# Patient Record
Sex: FEMALE | Race: OTHER
[De-identification: names, ages, dates, MRNs, and addresses within clinical notes are randomized per-mention and may not be internally consistent; named-entity substitution may affect disease eponyms.]

---

## 2020-08-06 ENCOUNTER — TRANSCRIPTION ENCOUNTER (OUTPATIENT)
Age: 60
End: 2020-08-06

## 2021-06-22 ENCOUNTER — EMERGENCY (EMERGENCY)
Facility: HOSPITAL | Age: 61
LOS: 1 days | Discharge: HOME | End: 2021-06-22
Attending: EMERGENCY MEDICINE
Payer: COMMERCIAL

## 2021-06-22 VITALS
HEIGHT: 63 IN | HEART RATE: 84 BPM | OXYGEN SATURATION: 100 % | RESPIRATION RATE: 16 BRPM | TEMPERATURE: 98 F | DIASTOLIC BLOOD PRESSURE: 91 MMHG | SYSTOLIC BLOOD PRESSURE: 183 MMHG | WEIGHT: 145.51 LBS

## 2021-06-22 PROCEDURE — 70450 CT HEAD/BRAIN W/O DYE: CPT | Mod: 26,MF

## 2021-06-22 PROCEDURE — 99284 EMERGENCY DEPT VISIT MOD MDM: CPT

## 2021-06-22 PROCEDURE — G1004: CPT

## 2021-06-22 PROCEDURE — 70450 CT HEAD/BRAIN W/O DYE: CPT

## 2021-06-22 PROCEDURE — 99284 EMERGENCY DEPT VISIT MOD MDM: CPT | Mod: 25

## 2021-06-22 RX ORDER — ACETAMINOPHEN 500 MG
650 TABLET ORAL ONCE
Refills: 0 | Status: COMPLETED | OUTPATIENT
Start: 2021-06-22 | End: 2021-06-22

## 2021-06-22 RX ADMIN — Medication 650 MILLIGRAM(S): at 10:18

## 2021-06-22 NOTE — ED STATDOCS - PATIENT PORTAL LINK FT
You can access the FollowMyHealth Patient Portal offered by [facility] by registering at the following website: [email or web address]. By joining [employer]’s FollowMyHealth portal, you will also be able to view your health information using other applications (apps) compatible with our system.

## 2021-06-22 NOTE — ED STATDOCS - CLINICAL SUMMARY MEDICAL DECISION MAKING FREE TEXT BOX
Likely concussion, CT head head injury without LOC and normal neuro exam:  Likely concussion. CT head, tylenol and ice pack

## 2021-06-22 NOTE — ED STATDOCS - NS ED SCRIBE STATEMENT
ED Time Seen By Provider Entered On:  10/18/2020 11:02     Performed On:  10/18/2020 11:02  by Keyur Mas DO               Time Seen By Provider   Time Seen by Provider :   10/18/2020 11:02    Keyur Mas DO - 10/18/2020 11:02                Electronically Signed On 10.18.2020 11:02  ___________________________________________________   Keyur Mas DO     Attending

## 2021-06-22 NOTE — ED ADULT NURSE NOTE - OBJECTIVE STATEMENT
60 y/o f a&ox3 comes to ED c/o was at work fell backwards hitting her head, -LOC. pt. denies getting dizzy.

## 2021-06-22 NOTE — ED STATDOCS - OBJECTIVE STATEMENT
62 y/o female with PMHx of HTN, DM s/p fall. Pt states while at work, pt was sitting in a chair with wheels, pt went to move in the chair and fell backward and hit the back of her head on floor. Pt denies LOC. Pt endorses headache. Pt denies N/V. Pt denies blood thinner usage. Pt denies neck or back pain.    PMD: Nathan Kumar 62 y/o female with PMHx of HTN, DM s/p fall backwards from a chair striking back of head on ground.  No loc.  headache and tender scalp swelling since.  No nausea/ vomiting.  denies neck pain or back pain.  No AC use.  .    PMD: Nathan Kumar    [facility] :  elmer

## 2021-06-22 NOTE — ED STATDOCS - PROGRESS NOTE DETAILS
patient seen by attending for fall, hit back of head. no loc,   CT ordered will follow up and reassess

## 2021-06-22 NOTE — ED STATDOCS - NSFOLLOWUPINSTRUCTIONS_ED_ALL_ED_FT
Tylenol extra strength 2 tablets every 4 hours or Ibuprofen 600mg (3 tablets) every 6 hours as needed for aches, pains. Apply ice to affected area for 15-20 minutes every few hours for the next few days.  Use as much or as frequently as desired. Otherwise, follow-up with PMD in 1-2 weeks for reassessment: call today to arrange an appointment

## 2021-06-22 NOTE — ED STATDOCS - NS_ ATTENDINGSCRIBEDETAILS _ED_A_ED_FT
I, Compa Nunez, performed the initial face to face bedside interview with this patient regarding history of present illness, review of symptoms and relevant past medical, social and family history.  I completed an independent physical examination.  I was the provider who initially evaluated this patient.  The history, relevant review of systems, past medical and surgical history, medical decision making, and physical examination was documented by the scribe in my presence and I attest to the accuracy of the documentation. Follow-up on ordered tests (ie labs, radiologic studies) and re-evaluation of the patient's status has been communicated to the ACP.  Disposition of the patient will be based on test outcome and response to ED interventions.

## 2022-06-14 PROBLEM — I10 ESSENTIAL (PRIMARY) HYPERTENSION: Chronic | Status: ACTIVE | Noted: 2021-06-22

## 2022-06-14 PROBLEM — E11.9 TYPE 2 DIABETES MELLITUS WITHOUT COMPLICATIONS: Chronic | Status: ACTIVE | Noted: 2021-06-22

## 2022-07-29 PROBLEM — Z00.00 ENCOUNTER FOR PREVENTIVE HEALTH EXAMINATION: Status: ACTIVE | Noted: 2022-07-29

## 2022-08-02 ENCOUNTER — APPOINTMENT (OUTPATIENT)
Dept: OBGYN | Facility: CLINIC | Age: 62
End: 2022-08-02

## 2022-08-02 VITALS
HEIGHT: 60 IN | WEIGHT: 130 LBS | SYSTOLIC BLOOD PRESSURE: 146 MMHG | BODY MASS INDEX: 25.52 KG/M2 | DIASTOLIC BLOOD PRESSURE: 78 MMHG

## 2022-08-02 DIAGNOSIS — Z83.3 FAMILY HISTORY OF DIABETES MELLITUS: ICD-10-CM

## 2022-08-02 DIAGNOSIS — Z82.49 FAMILY HISTORY OF ISCHEMIC HEART DISEASE AND OTHER DISEASES OF THE CIRCULATORY SYSTEM: ICD-10-CM

## 2022-08-02 DIAGNOSIS — I10 ESSENTIAL (PRIMARY) HYPERTENSION: ICD-10-CM

## 2022-08-02 DIAGNOSIS — E11.9 TYPE 2 DIABETES MELLITUS W/OUT COMPLICATIONS: ICD-10-CM

## 2022-08-02 PROCEDURE — 99386 PREV VISIT NEW AGE 40-64: CPT

## 2022-08-02 PROCEDURE — 99203 OFFICE O/P NEW LOW 30 MIN: CPT | Mod: 25

## 2022-08-02 RX ORDER — ATORVASTATIN CALCIUM 20 MG/1
20 TABLET, FILM COATED ORAL
Qty: 30 | Refills: 0 | Status: ACTIVE | COMMUNITY
Start: 2022-06-29

## 2022-08-02 RX ORDER — GLIPIZIDE 10 MG/1
10 TABLET, FILM COATED, EXTENDED RELEASE ORAL
Qty: 90 | Refills: 0 | Status: ACTIVE | COMMUNITY
Start: 2022-06-04

## 2022-08-02 RX ORDER — METFORMIN HYDROCHLORIDE 1000 MG/1
1000 TABLET, COATED ORAL
Qty: 180 | Refills: 0 | Status: ACTIVE | COMMUNITY
Start: 2022-06-04

## 2022-08-02 RX ORDER — FLUTICASONE PROPIONATE 50 UG/1
50 SPRAY, METERED NASAL
Qty: 10 | Refills: 0 | Status: ACTIVE | COMMUNITY
Start: 2022-04-01

## 2022-08-02 RX ORDER — BLOOD SUGAR DIAGNOSTIC
STRIP MISCELLANEOUS
Qty: 50 | Refills: 0 | Status: ACTIVE | COMMUNITY
Start: 2022-07-18

## 2022-08-02 RX ORDER — AMLODIPINE BESYLATE 10 MG/1
10 TABLET ORAL
Qty: 90 | Refills: 0 | Status: DISCONTINUED | COMMUNITY
Start: 2021-11-13

## 2022-08-02 RX ORDER — AMLODIPINE BESYLATE 10 MG/1
10 TABLET ORAL
Qty: 90 | Refills: 0 | Status: ACTIVE | COMMUNITY
Start: 2021-11-13

## 2022-08-02 RX ORDER — LOSARTAN POTASSIUM 100 MG/1
100 TABLET, FILM COATED ORAL
Qty: 90 | Refills: 0 | Status: ACTIVE | COMMUNITY
Start: 2022-07-29

## 2022-08-03 LAB
C TRACH RRNA SPEC QL NAA+PROBE: NOT DETECTED
CANDIDA VAG CYTO: NOT DETECTED
G VAGINALIS+PREV SP MTYP VAG QL MICRO: NOT DETECTED
N GONORRHOEA RRNA SPEC QL NAA+PROBE: NOT DETECTED
SOURCE TP AMPLIFICATION: NORMAL
T VAGINALIS VAG QL WET PREP: NOT DETECTED

## 2022-08-04 PROBLEM — Z82.49 FAMILY HISTORY OF HYPERTENSION: Status: ACTIVE | Noted: 2022-08-02

## 2022-08-04 PROBLEM — Z83.3 FAMILY HISTORY OF DIABETES MELLITUS: Status: ACTIVE | Noted: 2022-08-02

## 2022-08-04 NOTE — PHYSICAL EXAM
[Appropriately responsive] : appropriately responsive [Alert] : alert [No Acute Distress] : no acute distress [Soft] : soft [Non-tender] : non-tender [Non-distended] : non-distended [Oriented x3] : oriented x3 [Examination Of The Breasts] : a normal appearance [No Discharge] : no discharge [No Masses] : no breast masses were palpable [Labia Majora] : normal [Labia Minora] : normal [No Bleeding] : There was no active vaginal bleeding [Normal] : normal [Uterine Adnexae] : normal [Breast Nipple Inversion] : inverted [Atrophy] : atrophy [Dry Mucosa] : dry mucosa [FreeTextEntry1] :  Three wart like raised lesions posterior introitus.

## 2022-08-04 NOTE — HISTORY OF PRESENT ILLNESS
[N] : Patient reports normal menses [Tubal Occlusion] : has had a tubal occlusion [Y] : Positive pregnancy history [Menarche Age: ____] : age at menarche was [unfilled] [Yes] : Patient has concerns regarding sex [Currently Active] : currently active [No] : No [Patient refuses STI testing] : Patient refuses STI testing [Mammogramdate] : 01/2021 [TextBox_19] : AS PER PATIENT  [PapSmeardate] : 12/2021 [TextBox_31] : AS PER PATIENT  [LMPDate] : 2008 [PGHxTotal] : 4 [[facility]Term] : 3 [Banneriving] : 3 [FreeTextEntry1] : DRYNESS, PAIN

## 2022-08-04 NOTE — DISCUSSION/SUMMARY
[FreeTextEntry1] : Benign breast and pelvic exam.. Pap done today. Affirm collected. Stool for occult blood was negative.\par \par Prescription for mammogram screening and breast US given. I recommended to receive her breast imaging at a [facility]. \par \par Self-breast exam reviewed.\par \par We discussed my recommendation for coconut oil for vaginal dryness as needed. \par \par F/u vulvar biopsy.

## 2022-08-04 NOTE — END OF VISIT
[FreeTextEntry3] : I, Carl Downing solely acted as scribe for Dr. Venus Tracey on 08/02/2022 \par All medical entries made by the Scribe were at my, Dr. Tracey’s, direction and personally\par dictated by me on 08/02/2022 . I have reviewed the chart and agree that the record\par accurately reflects my personal performance of the history, physical exam, assessment and plan. I\par have also personally directed, reviewed, and agreed with the chart.

## 2022-08-06 LAB — CYTOLOGY CVX/VAG DOC THIN PREP: NORMAL

## 2022-08-10 ENCOUNTER — APPOINTMENT (OUTPATIENT)
Dept: OBGYN | Facility: CLINIC | Age: 62
End: 2022-08-10

## 2022-08-10 VITALS
BODY MASS INDEX: 25.52 KG/M2 | SYSTOLIC BLOOD PRESSURE: 136 MMHG | HEIGHT: 60 IN | DIASTOLIC BLOOD PRESSURE: 76 MMHG | WEIGHT: 130 LBS | TEMPERATURE: 97 F

## 2022-08-10 DIAGNOSIS — Z01.419 ENCOUNTER FOR GYNECOLOGICAL EXAMINATION (GENERAL) (ROUTINE) W/OUT ABNORMAL FINDINGS: ICD-10-CM

## 2022-08-10 DIAGNOSIS — Z92.89 PERSONAL HISTORY OF OTHER MEDICAL TREATMENT: ICD-10-CM

## 2022-08-10 DIAGNOSIS — Z12.11 ENCOUNTER FOR SCREENING FOR MALIGNANT NEOPLASM OF COLON: ICD-10-CM

## 2022-08-10 DIAGNOSIS — Z87.42 PERSONAL HISTORY OF OTHER DISEASES OF THE FEMALE GENITAL TRACT: ICD-10-CM

## 2022-08-10 PROCEDURE — 11423 EXC H-F-NK-SP B9+MARG 2.1-3: CPT

## 2022-08-15 PROBLEM — Z01.419 ENCOUNTER FOR WELL WOMAN EXAM WITH ROUTINE GYNECOLOGICAL EXAM: Status: RESOLVED | Noted: 2022-08-02 | Resolved: 2022-08-15

## 2022-08-15 PROBLEM — Z87.42 HISTORY OF VAGINAL DISCHARGE: Status: RESOLVED | Noted: 2022-08-02 | Resolved: 2022-08-15

## 2022-08-15 PROBLEM — Z92.89 HISTORY OF SCREENING MAMMOGRAPHY: Status: RESOLVED | Noted: 2022-08-02 | Resolved: 2022-08-15

## 2022-08-15 PROBLEM — Z12.11 COLON CANCER SCREENING: Status: RESOLVED | Noted: 2022-08-02 | Resolved: 2022-08-15

## 2022-08-15 NOTE — PHYSICAL EXAM
[Chaperone Present] : A chaperone was present in the examining room during all aspects of the physical examination [Appropriately responsive] : appropriately responsive [Alert] : alert [No Acute Distress] : no acute distress [Oriented x3] : oriented x3 [FreeTextEntry1] : KEVIN Lilly

## 2022-08-15 NOTE — HISTORY OF PRESENT ILLNESS
[postmenopausal] : postmenopausal [N] : Patient reports normal menses [Tubal Occlusion] : has had a tubal occlusion [Y] : Positive pregnancy history [Menarche Age: ____] : age at menarche was [unfilled] [No] : Patient does not have concerns regarding sex [Currently Active] : currently active [PapSmeardate] : 08/02/22 [TextBox_31] : NEG [GonorrheaDate] : 08/02/22 [TextBox_63] : NEG [ChlamydiaDate] : 08/02/22 [TextBox_68] : NEG [LMPDate] : 2008 [PGHxTotal] : 4 [[facility]Term] : 3 [[facility] Centeriving] : 3 [FreeTextEntry1] : 2008

## 2022-08-15 NOTE — DISCUSSION/SUMMARY
[FreeTextEntry1] : Vulvar biopsy done today. Consent obtained. 2x2.5 cm lesion removed and sent to pathology. Patient tolerated well. Aftercare instructions given. \par \par Call parameters and precautions given.\par \par She will follow up in 1 week for a follow up of her results and suture removal

## 2022-08-15 NOTE — END OF VISIT
[FreeTextEntry3] : I, Roxy Loza, solely acted as a scribe for Dr. Venus Tracey on 08/10/2022 . All medical entries made by the Scribe were at my, Dr. Tracey's, direction and personally dictated by me on 08/10/2022. I have reviewed the chart and agree that the record accurately reflects my personal performance of the history, physical exam, assessment and plan. I have also personally directed, reviewed and agreed with the chart.

## 2022-08-15 NOTE — PROCEDURE
[Vulvar Biopsy] : Vulvar Biopsy [Consent Obtained] : Consent obtained [Local Anesthesia] : local anesthesia [____ Lidocaine w/o Epi] : ~VmL lidocaine without epinephrine [Betadine] : Betadine [Sent to Pathology] : placed in buffered formalin and sent for pathology [Scalpel] : scalpel [Saskia's] : Saskia's solution [Silver Nitrate] : silver nitrate [Tolerated Well] : the patient tolerated the procedure well [No Complications] : there were no complications [Size of Biopsy Taken: ___ (mm)] : [unfilled]Umm [de-identified] : Posterior vagina w/ 3 large wart like lesions  [de-identified] : Area swabbed w/ betadine. Lidocaine injected.  [de-identified] : Continuous stitch used for larger incision.

## 2022-08-17 ENCOUNTER — APPOINTMENT (OUTPATIENT)
Dept: OBGYN | Facility: CLINIC | Age: 62
End: 2022-08-17

## 2022-08-17 VITALS
DIASTOLIC BLOOD PRESSURE: 70 MMHG | SYSTOLIC BLOOD PRESSURE: 120 MMHG | HEIGHT: 60 IN | BODY MASS INDEX: 25.52 KG/M2 | WEIGHT: 130 LBS

## 2022-08-17 DIAGNOSIS — N90.89 OTHER SPECIFIED NONINFLAMMATORY DISORDERS OF VULVA AND PERINEUM: ICD-10-CM

## 2022-08-17 PROCEDURE — 99024 POSTOP FOLLOW-UP VISIT: CPT

## 2022-08-17 NOTE — HISTORY OF PRESENT ILLNESS
[N] : Patient reports normal menses [Tubal Occlusion] : has had a tubal occlusion [Y] : Positive pregnancy history [Menarche Age: ____] : age at menarche was [unfilled] [Currently Active] : currently active [PapSmeardate] : 8/02/2022 [TextBox_31] : WNL [GonorrheaDate] : 8/02/2022 [TextBox_63] : NEG [ChlamydiaDate] : 8/02/2022 [TextBox_68] : NEG [LMPDate] : 2008 [PGHxTotal] : 4 [[facility] CenterlTerm] : 3 [[facility]iving] : 3 [FreeTextEntry1] : 2008

## 2022-08-17 NOTE — PHYSICAL EXAM
[Appropriately responsive] : appropriately responsive [Alert] : alert [No Acute Distress] : no acute distress [Oriented x3] : oriented x3 [FreeTextEntry1] : Biopsy site healing well. Sutures removed.

## 2022-08-19 ENCOUNTER — NON-APPOINTMENT (OUTPATIENT)
Age: 62
End: 2022-08-19

## 2022-08-19 LAB — CORE LAB BIOPSY: NORMAL

## 2023-05-03 NOTE — DISCUSSION/SUMMARY
Patient was contacted to complete the pre-visit call prior to their telephone visit with the provider.     Allergies and medications were reviewed.     I thanked them for their time to cover this information.     Melissa Fuentes, CMA       [FreeTextEntry1] : Healing well. Sutures removed. No sign of infection. \par \par Pathology still pending. Will call her when results are available.\par \par F/u annually or as needed.

## 2024-08-04 ENCOUNTER — EMERGENCY (EMERGENCY)
Facility: HOSPITAL | Age: 64
LOS: 1 days | Discharge: HOME | End: 2024-08-04
Attending: STUDENT IN AN ORGANIZED HEALTH CARE EDUCATION/TRAINING PROGRAM
Payer: COMMERCIAL

## 2024-08-04 VITALS
SYSTOLIC BLOOD PRESSURE: 163 MMHG | RESPIRATION RATE: 18 BRPM | TEMPERATURE: 99 F | OXYGEN SATURATION: 96 % | DIASTOLIC BLOOD PRESSURE: 80 MMHG | HEART RATE: 98 BPM

## 2024-08-04 PROCEDURE — 99285 EMERGENCY DEPT VISIT HI MDM: CPT | Mod: 25

## 2024-08-05 VITALS
RESPIRATION RATE: 18 BRPM | OXYGEN SATURATION: 98 % | TEMPERATURE: 98 F | HEART RATE: 79 BPM | SYSTOLIC BLOOD PRESSURE: 151 MMHG | DIASTOLIC BLOOD PRESSURE: 70 MMHG

## 2024-08-05 LAB
ACETONE SERPL-MCNC: NEGATIVE — SIGNIFICANT CHANGE UP
ALBUMIN SERPL ELPH-MCNC: 4.2 G/DL — SIGNIFICANT CHANGE UP (ref 3.3–5.2)
ALP SERPL-CCNC: 80 U/L — SIGNIFICANT CHANGE UP (ref 40–120)
ALT FLD-CCNC: 20 U/L — SIGNIFICANT CHANGE UP
ANION GAP SERPL CALC-SCNC: 17 MMOL/L — SIGNIFICANT CHANGE UP (ref 5–17)
ANION GAP SERPL CALC-SCNC: 18 MMOL/L — HIGH (ref 5–17)
AST SERPL-CCNC: 23 U/L — SIGNIFICANT CHANGE UP
BASOPHILS # BLD AUTO: 0.1 K/UL — SIGNIFICANT CHANGE UP (ref 0–0.2)
BASOPHILS NFR BLD AUTO: 1 % — SIGNIFICANT CHANGE UP (ref 0–2)
BILIRUB SERPL-MCNC: 0.3 MG/DL — LOW (ref 0.4–2)
BUN SERPL-MCNC: 19.8 MG/DL — SIGNIFICANT CHANGE UP (ref 8–20)
BUN SERPL-MCNC: 21.4 MG/DL — HIGH (ref 8–20)
CALCIUM SERPL-MCNC: 9 MG/DL — SIGNIFICANT CHANGE UP (ref 8.4–10.5)
CALCIUM SERPL-MCNC: 9.6 MG/DL — SIGNIFICANT CHANGE UP (ref 8.4–10.5)
CHLORIDE SERPL-SCNC: 103 MMOL/L — SIGNIFICANT CHANGE UP (ref 96–108)
CHLORIDE SERPL-SCNC: 97 MMOL/L — SIGNIFICANT CHANGE UP (ref 96–108)
CO2 SERPL-SCNC: 18 MMOL/L — LOW (ref 22–29)
CO2 SERPL-SCNC: 20 MMOL/L — LOW (ref 22–29)
CREAT SERPL-MCNC: 0.64 MG/DL — SIGNIFICANT CHANGE UP (ref 0.5–1.3)
CREAT SERPL-MCNC: 0.66 MG/DL — SIGNIFICANT CHANGE UP (ref 0.5–1.3)
EGFR: 99 ML/MIN/1.73M2 — SIGNIFICANT CHANGE UP
EGFR: 99 ML/MIN/1.73M2 — SIGNIFICANT CHANGE UP
EOSINOPHIL # BLD AUTO: 0.2 K/UL — SIGNIFICANT CHANGE UP (ref 0–0.5)
EOSINOPHIL NFR BLD AUTO: 2 % — SIGNIFICANT CHANGE UP (ref 0–6)
GAS PNL BLDV: SIGNIFICANT CHANGE UP
GLUCOSE SERPL-MCNC: 127 MG/DL — HIGH (ref 70–99)
GLUCOSE SERPL-MCNC: 167 MG/DL — HIGH (ref 70–99)
HCT VFR BLD CALC: 37.3 % — SIGNIFICANT CHANGE UP (ref 34.5–45)
HGB BLD-MCNC: 12.5 G/DL — SIGNIFICANT CHANGE UP (ref 11.5–15.5)
IMM GRANULOCYTES NFR BLD AUTO: 0.3 % — SIGNIFICANT CHANGE UP (ref 0–0.9)
LACTATE BLDV-MCNC: 2.6 MMOL/L — HIGH (ref 0.5–2)
LYMPHOCYTES # BLD AUTO: 2.19 K/UL — SIGNIFICANT CHANGE UP (ref 1–3.3)
LYMPHOCYTES # BLD AUTO: 22.2 % — SIGNIFICANT CHANGE UP (ref 13–44)
MCHC RBC-ENTMCNC: 29.9 PG — SIGNIFICANT CHANGE UP (ref 27–34)
MCHC RBC-ENTMCNC: 33.5 GM/DL — SIGNIFICANT CHANGE UP (ref 32–36)
MCV RBC AUTO: 89.2 FL — SIGNIFICANT CHANGE UP (ref 80–100)
MONOCYTES # BLD AUTO: 0.53 K/UL — SIGNIFICANT CHANGE UP (ref 0–0.9)
MONOCYTES NFR BLD AUTO: 5.4 % — SIGNIFICANT CHANGE UP (ref 2–14)
NEUTROPHILS # BLD AUTO: 6.82 K/UL — SIGNIFICANT CHANGE UP (ref 1.8–7.4)
NEUTROPHILS NFR BLD AUTO: 69.1 % — SIGNIFICANT CHANGE UP (ref 43–77)
PLATELET # BLD AUTO: 339 K/UL — SIGNIFICANT CHANGE UP (ref 150–400)
POTASSIUM SERPL-MCNC: 3.9 MMOL/L — SIGNIFICANT CHANGE UP (ref 3.5–5.3)
POTASSIUM SERPL-MCNC: 3.9 MMOL/L — SIGNIFICANT CHANGE UP (ref 3.5–5.3)
POTASSIUM SERPL-SCNC: 3.9 MMOL/L — SIGNIFICANT CHANGE UP (ref 3.5–5.3)
POTASSIUM SERPL-SCNC: 3.9 MMOL/L — SIGNIFICANT CHANGE UP (ref 3.5–5.3)
PROT SERPL-MCNC: 7.2 G/DL — SIGNIFICANT CHANGE UP (ref 6.6–8.7)
RBC # BLD: 4.18 M/UL — SIGNIFICANT CHANGE UP (ref 3.8–5.2)
RBC # FLD: 13.2 % — SIGNIFICANT CHANGE UP (ref 10.3–14.5)
SODIUM SERPL-SCNC: 135 MMOL/L — SIGNIFICANT CHANGE UP (ref 135–145)
SODIUM SERPL-SCNC: 138 MMOL/L — SIGNIFICANT CHANGE UP (ref 135–145)
WBC # BLD: 9.87 K/UL — SIGNIFICANT CHANGE UP (ref 3.8–10.5)
WBC # FLD AUTO: 9.87 K/UL — SIGNIFICANT CHANGE UP (ref 3.8–10.5)

## 2024-08-05 PROCEDURE — 96360 HYDRATION IV INFUSION INIT: CPT

## 2024-08-05 PROCEDURE — 85025 COMPLETE CBC W/AUTO DIFF WBC: CPT

## 2024-08-05 PROCEDURE — 80048 BASIC METABOLIC PNL TOTAL CA: CPT

## 2024-08-05 PROCEDURE — 93005 ELECTROCARDIOGRAM TRACING: CPT

## 2024-08-05 PROCEDURE — 85018 HEMOGLOBIN: CPT

## 2024-08-05 PROCEDURE — 84132 ASSAY OF SERUM POTASSIUM: CPT

## 2024-08-05 PROCEDURE — 82435 ASSAY OF BLOOD CHLORIDE: CPT

## 2024-08-05 PROCEDURE — T1013: CPT

## 2024-08-05 PROCEDURE — 82803 BLOOD GASES ANY COMBINATION: CPT

## 2024-08-05 PROCEDURE — 93010 ELECTROCARDIOGRAM REPORT: CPT

## 2024-08-05 PROCEDURE — 99283 EMERGENCY DEPT VISIT LOW MDM: CPT

## 2024-08-05 PROCEDURE — 36415 COLL VENOUS BLD VENIPUNCTURE: CPT

## 2024-08-05 PROCEDURE — 80053 COMPREHEN METABOLIC PANEL: CPT

## 2024-08-05 PROCEDURE — 82009 KETONE BODYS QUAL: CPT

## 2024-08-05 PROCEDURE — 82330 ASSAY OF CALCIUM: CPT

## 2024-08-05 PROCEDURE — 83605 ASSAY OF LACTIC ACID: CPT

## 2024-08-05 PROCEDURE — 82947 ASSAY GLUCOSE BLOOD QUANT: CPT

## 2024-08-05 PROCEDURE — 84295 ASSAY OF SERUM SODIUM: CPT

## 2024-08-05 PROCEDURE — 85014 HEMATOCRIT: CPT

## 2024-08-05 RX ORDER — BACTERIOSTATIC SODIUM CHLORIDE 0.9 %
1000 VIAL (ML) INJECTION ONCE
Refills: 0 | Status: COMPLETED | OUTPATIENT
Start: 2024-08-05 | End: 2024-08-05

## 2024-08-05 RX ADMIN — Medication 1000 MILLILITER(S): at 01:45

## 2024-08-05 RX ADMIN — Medication 1000 MILLILITER(S): at 00:55

## 2024-08-05 RX ADMIN — Medication 1000 MILLILITER(S): at 02:25

## 2024-08-05 NOTE — ED PROVIDER NOTE - NSFOLLOWUPINSTRUCTIONS_ED_ALL_ED_FT
Deshidratación en los adultos  Dehydration, Adult  La deshidratación es josé afección que se caracteriza por josé cantidad insuficiente de agua u otros líquidos en el organismo. [place] sucede cuando josé persona pierde más líquido del que consume. Los órganos importantes, abhishek los riñones, el cerebro y el corazón, no pueden funcionar sin la cantidad adecuada de líquidos. Cualquier pérdida de líquidos del organismo puede causar deshidratación.    La deshidratación puede ser leve, moderada o grave. Debe tratarse de inmediato para evitar que se agrave.    ¿Cuáles son las causas?  Las causas de la deshidratación pueden ser las siguientes:  Problemas de sarah, abhishek diarrea, vómitos, fiebre, infección, o sudar u orinar mucho.  No beber la cantidad suficiente de líquidos.  Determinados medicamentos, abhishek aquellos que eliminan el exceso de líquido del cuerpo (diuréticos).  Falta de agua potable marinelli.  No poder obtener suficiente agua y alimentos.  ¿Qué incrementa el riesgo?  Los siguientes factores pueden hacer que sea más propenso a desarrollar esta afección:  Tener josé enfermedad prolongada (crónica) que no se ha tratado adecuadamente, abhishek diabetes, enfermedad cardíaca o enfermedad renal.  Ser mayor de 65 años de edad.  Tener josé discapacidad.  Vivir en un lugar de gran altitud, donde el aire menos denso y más seco causa más pérdida de líquidos.  Hacer ejercicios que sobrecargan el cuerpo ya mucho tiempo (deportes de resistencia).  Estar activo en un clima caluroso.  ¿Cuáles son los signos o síntomas?  Los síntomas de deshidratación dependen de sidhu gravedad.    Deshidratación leve o moderada    Sed.  Sequedad en los labios o la boca.  Mareos o sensación de desvanecimiento.  Calambres musculares.  Orina de color oscuro. La orina puede ser color té.  Demario producción de orina o lágrimas que lo normal.  Dolor de aysha.  Deshidratación grave    Cambios en la piel. La piel puede estar fría y pegajosa, con manchas o pálida. También es posible que la piel no vuelva a la normalidad después de pellizcarla ligeramente y soltarla.  Escasa producción o ausencia de lágrimas, orina o sudor.  Respiración rápida y presión arterial baja. El pulso puede ser débil o puede tener más de 100 latidos por minuto cuando está sentado quieto.  Otros cambios, por ejemplo:  Sentir mucha sed.  Ojos hundidos.  Bonnie y pies fríos.  Confusión.  Estar muy cansado (aletargado) o tener problemas para despertarse.  Pérdida de peso a corto plazo.  Pérdida de la conciencia.  ¿Cómo se diagnostica?  Esta afección se diagnostica en función de los síntomas y de un examen físico. Se le pueden hacer análisis de wendi y orina para ayudar a confirmar el diagnóstico.    ¿Cómo se trata?  A person's hand, showing an inserted IV catheter.   El tratamiento de esta afección depende de sidhu gravedad. El tratamiento se debe comenzar de inmediato. No espere hasta que la deshidratación sea grave. La deshidratación grave es josé emergencia y debe tratarse en un hospital.  La deshidratación leve o moderada puede tratarse en la casa. Le pedirán que:  Brigid más líquidos.  Brigid josé solución de rehidratación oral (SRO). Esta bebida restablece los líquidos, las sales y los minerales en la wendi (electrolitos).  Interrumpa cualquier actividad que haya causado deshidratación, abhishek hacer ejercicio.  Se refresque con compresas frías, bruma fría o líquidos fríos, si el calor o el exceso de sudor le causaron la afección.  Ballou medicamentos para tratar la fiebre, si la fiebre le causó la afección.  Ballou medicamentos para tratar las náuseas y la diarrea, si los vómitos o la diarrea le causaron la afección.  La deshidratación grave puede tratarse de la siguiente manera:  Con líquidos intravenosos (i.v.).  Corrigiendo los niveles anormales de electrolitos en el organismo.  Tratando la causa subyacente de la deshidratación.  Siga estas instrucciones en sidhu casa:  Solución de rehidratación oral    A glass of water with a spoonful of ORS ready to be added to it.  Si se lo indicó el médico, tome josé SRO:  Para preparar josé SRO, siga las instrucciones del envase.  Comience por beber pequeñas cantidades, aproximadamente ½ taza (120 ml) cada 5 a 10 minutos.  Aumente lentamente la cantidad que luana hasta que haya ingerido la cantidad recomendada por el médico.  Comida y bebida    A person drinking water from a glass.   Brigid suficiente líquido transparente abhishek para mantener la orina de color amarillo pálido. Si le indicaron que brigid josé SRO, termine dannielle la solución y luego empiece a beber lentamente otros líquidos transparentes. Brigid líquidos, por ejemplo:  Agua. No brigid solamente agua. [place] puede provocar hiponatremia, que es tener escasez de sal (sodio) en el organismo.  Agua de trocitos de hielo que usted succiona.  Jugo de frutas diluido. [place] es jugo de frutas rebajado con agua.  Bebidas deportivas de bajas calorías.  Consuma los alimentos que contienen un equilibrio saludable de electrolitos, abhishek las bananas, las naranjas, las roberto, los tomates y la espinaca.  No brigid alcohol.  Evite lo siguiente:  Bebidas que contengan gran cantidad de azúcar. Entre estas se incluyen las bebidas deportivas ricas en calorías, el jugo de frutas sin diluir y los refrescos o gaseosas.  Cafeína.  Los alimentos con alto contenido de grasa o azúcar.  Instrucciones generales    Use los medicamentos de venta monika y los recetados solamente abhishek se lo haya indicado el médico.  No tome comprimidos de sodio. [place] puede causar la acumulación excesiva de sodio en el organismo (hipernatremia).  Retome aubree actividades normales abhishek se lo haya indicado el médico. Pregúntele al médico qué actividades son seguras para usted.  Concurra a todas las visitas de seguimiento. Es posible que el médico deba controlar sidhu evolución y le sugiera nuevas formas de tratar sidhu afección.  Comuníquese con un médico si:  Tiene calambres musculares, dolor o molestias, por ejemplo:  Dolor en el abdomen y el dolor empeora o se mantiene en un área.  Rigidez en el katarzyna.  Tiene josé erupción cutánea.  Se siente más irritable de lo habitual.  Está más somnoliento o le mo más despertarse.  Se siente débil o mareado.  Tiene mucha sed.  Solicite ayuda de inmediato si:  Tiene síntomas de deshidratación grave.  Vomita cada vez que come o luana.  Los vómitos empeoran, no desaparecen o incluyen wendi o josé sustancia harvinder (bilis).  Recibe tratamiento, екатерина los síntomas empeoran.  Tiene fiebre.  Tiene un dolor de aysha intenso.  Tiene lo siguiente:  Diarrea que empeora o que no desaparece.  Wendi en las heces. [place] puede hacer que la materia fecal sea aleida y de aspecto alquitranado.  No orina u orina solamente josé pequeña cantidad de color muy oscuro en el término de 6 a 8 horas.  Tiene dificultad para respirar.  Estos síntomas pueden indicar josé emergencia. Solicite ayuda de inmediato.  No espere a janis si los síntomas desaparecen.  No conduzca por aubree propios medios hasta el hospital. Llame al 911.  Esta información no tiene abhishek fin reemplazar el consejo del médico. Asegúrese de hacerle al médico cualquier pregunta que tenga.

## 2024-08-05 NOTE — ED ADULT NURSE NOTE - OBJECTIVE STATEMENT
presents to ED c/o hypertension, dizziness, pain to left arm and neck, and a "trumbling" sensation in chest. states her doctor changed her bp medication one day ago. denies  SOB/dyspnea, dizziness/headache/lightheadedness/blurry vision tingling/numbness, fevers/chills, N/V/D, urinary S&S. did not take medication for pain prior to arrival.

## 2024-08-05 NOTE — ED PROVIDER NOTE - PHYSICAL EXAMINATION
Constitutional: Awake, alert, in no acute distress  Eyes: no scleral icterus  HENT: normocephalic, atraumatic, moist oral mucosa  Neck: supple  CV: RRR, no murmur  Pulm: non-labored respirations, CTAB  Abdomen: soft, non-tender, non-distended  Extremities: no edema, no deformity  Skin: no rash, no jaundice  Neuro: AAOx3, moving all extremities equally, no focal neuro deficits, ambulatory with steady gait Constitutional: Awake, alert, in no acute distress  Eyes: no scleral icterus  HENT: normocephalic, atraumatic, +dry oral mucosa  Neck: supple  CV: RRR, no murmur  Pulm: non-labored respirations, CTAB  Abdomen: soft, non-tender, non-distended  Extremities: no edema, no deformity  Skin: no rash, no jaundice  Neuro: AAOx3, moving all extremities equally, no focal neuro deficits, ambulatory with steady gait

## 2024-08-05 NOTE — ED PROVIDER NOTE - CLINICAL SUMMARY MEDICAL DECISION MAKING FREE TEXT BOX
64y F w/ hx HTN, DM; presents for dizziness and concern of elevated BP. Pt with nonfocal neuro exam. Labs notable for elevated lactate. No sign of DKA. Pt felt better with fluids. Symptoms likely 2/2 dehydration. Medically stable for discharge with outpatient f/u.

## 2024-08-05 NOTE — ED PROVIDER NOTE - PATIENT PORTAL LINK FT
You can access the FollowMyHealth Patient Portal offered by [facility] by registering at the following website: [email or web address]. By joining [employer]’s FollowMyHealth portal, you will also be able to view your health information using other applications (apps) compatible with our system.

## 2024-08-05 NOTE — ED ADULT NURSE NOTE - NSFALLUNIVINTERV_ED_ALL_ED
Bed/Stretcher in lowest position, wheels locked, appropriate side rails in place/Call bell, personal items and telephone in reach/Instruct patient to call for assistance before getting out of bed/chair/stretcher/Non-slip footwear applied when patient is off stretcher/[place] to call system/Physically safe environment - no spills, clutter or unnecessary equipment/Purposeful proactive rounding/Room/bathroom lighting operational, light cord in reach

## 2024-08-05 NOTE — ED PROVIDER NOTE - OBJECTIVE STATEMENT
64y F w/ hx HTN, HLD, DM; presents for dizziness. Pt reports 1 week of dizziness and generalized malaise. No fever, cough, chest pain, SOB. Says she saw her PCP yesterday; was noted to have elevated BP and had her meds adjusted. Pt now presents with persistent symptoms. Says her BP was elevated to 180s systolic prior to arrival.

## 2024-08-07 DIAGNOSIS — R53.1 WEAKNESS: ICD-10-CM

## 2024-08-07 DIAGNOSIS — I10 ESSENTIAL (PRIMARY) HYPERTENSION: ICD-10-CM

## 2024-08-07 DIAGNOSIS — R42 DIZZINESS AND GIDDINESS: ICD-10-CM

## 2024-08-07 DIAGNOSIS — E11.9 TYPE 2 DIABETES MELLITUS WITHOUT COMPLICATIONS: ICD-10-CM

## 2024-08-26 NOTE — ED ADULT NURSE NOTE - INTERPRETER'S NAME
Prep Survey      Flowsheet Row Responses   [facility] patient discharged from? [facility]   Is LACE score < 7 ? Yes   Eligibility [facility]   Date of Admission 08/25/24   Date of Discharge 08/26/24   Discharge Disposition Home or Self Care   Discharge diagnosis SHAVON (acute kidney injury)   Does the patient have one of the following disease processes/diagnoses(primary or secondary)? Other   Does the patient have Home health ordered? No   Is there a DME ordered? No   Prep survey completed? Yes            Yudy PACK - Registered Nurse          
henderson

## 2024-11-06 ENCOUNTER — EMERGENCY (EMERGENCY)
Facility: HOSPITAL | Age: 64
LOS: 1 days | End: 2024-11-06
Attending: EMERGENCY MEDICINE
Payer: COMMERCIAL

## 2024-11-06 VITALS
DIASTOLIC BLOOD PRESSURE: 80 MMHG | TEMPERATURE: 99 F | HEIGHT: 60 IN | WEIGHT: 128.75 LBS | HEART RATE: 78 BPM | SYSTOLIC BLOOD PRESSURE: 170 MMHG | RESPIRATION RATE: 18 BRPM | OXYGEN SATURATION: 97 %

## 2024-11-06 LAB
A1C WITH ESTIMATED AVERAGE GLUCOSE RESULT: 8.3 % — HIGH (ref 4–5.6)
ALBUMIN SERPL ELPH-MCNC: 4.1 G/DL — SIGNIFICANT CHANGE UP (ref 3.3–5.2)
ALP SERPL-CCNC: 71 U/L — SIGNIFICANT CHANGE UP (ref 40–120)
ALT FLD-CCNC: 16 U/L — SIGNIFICANT CHANGE UP
ANION GAP SERPL CALC-SCNC: 15 MMOL/L — SIGNIFICANT CHANGE UP (ref 5–17)
AST SERPL-CCNC: 27 U/L — SIGNIFICANT CHANGE UP
BASOPHILS # BLD AUTO: 0.05 K/UL — SIGNIFICANT CHANGE UP (ref 0–0.2)
BASOPHILS NFR BLD AUTO: 0.6 % — SIGNIFICANT CHANGE UP (ref 0–2)
BILIRUB SERPL-MCNC: 0.2 MG/DL — LOW (ref 0.4–2)
BUN SERPL-MCNC: 16.8 MG/DL — SIGNIFICANT CHANGE UP (ref 8–20)
CALCIUM SERPL-MCNC: 9 MG/DL — SIGNIFICANT CHANGE UP (ref 8.4–10.5)
CHLORIDE SERPL-SCNC: 104 MMOL/L — SIGNIFICANT CHANGE UP (ref 96–108)
CO2 SERPL-SCNC: 21 MMOL/L — LOW (ref 22–29)
CREAT SERPL-MCNC: 0.54 MG/DL — SIGNIFICANT CHANGE UP (ref 0.5–1.3)
EGFR: 103 ML/MIN/1.73M2 — SIGNIFICANT CHANGE UP
EOSINOPHIL # BLD AUTO: 0.05 K/UL — SIGNIFICANT CHANGE UP (ref 0–0.5)
EOSINOPHIL NFR BLD AUTO: 0.6 % — SIGNIFICANT CHANGE UP (ref 0–6)
ESTIMATED AVERAGE GLUCOSE: 192 MG/DL — HIGH (ref 68–114)
GLUCOSE SERPL-MCNC: 168 MG/DL — HIGH (ref 70–99)
HCT VFR BLD CALC: 37.9 % — SIGNIFICANT CHANGE UP (ref 34.5–45)
HGB BLD-MCNC: 12.5 G/DL — SIGNIFICANT CHANGE UP (ref 11.5–15.5)
IMM GRANULOCYTES NFR BLD AUTO: 0.2 % — SIGNIFICANT CHANGE UP (ref 0–0.9)
LYMPHOCYTES # BLD AUTO: 1.96 K/UL — SIGNIFICANT CHANGE UP (ref 1–3.3)
LYMPHOCYTES # BLD AUTO: 22.2 % — SIGNIFICANT CHANGE UP (ref 13–44)
MCHC RBC-ENTMCNC: 28.5 PG — SIGNIFICANT CHANGE UP (ref 27–34)
MCHC RBC-ENTMCNC: 33 G/DL — SIGNIFICANT CHANGE UP (ref 32–36)
MCV RBC AUTO: 86.5 FL — SIGNIFICANT CHANGE UP (ref 80–100)
MONOCYTES # BLD AUTO: 0.56 K/UL — SIGNIFICANT CHANGE UP (ref 0–0.9)
MONOCYTES NFR BLD AUTO: 6.3 % — SIGNIFICANT CHANGE UP (ref 2–14)
NEUTROPHILS # BLD AUTO: 6.2 K/UL — SIGNIFICANT CHANGE UP (ref 1.8–7.4)
NEUTROPHILS NFR BLD AUTO: 70.1 % — SIGNIFICANT CHANGE UP (ref 43–77)
PLATELET # BLD AUTO: 369 K/UL — SIGNIFICANT CHANGE UP (ref 150–400)
POTASSIUM SERPL-MCNC: 4.3 MMOL/L — SIGNIFICANT CHANGE UP (ref 3.5–5.3)
POTASSIUM SERPL-SCNC: 4.3 MMOL/L — SIGNIFICANT CHANGE UP (ref 3.5–5.3)
PROT SERPL-MCNC: 7.2 G/DL — SIGNIFICANT CHANGE UP (ref 6.6–8.7)
RBC # BLD: 4.38 M/UL — SIGNIFICANT CHANGE UP (ref 3.8–5.2)
RBC # FLD: 13.9 % — SIGNIFICANT CHANGE UP (ref 10.3–14.5)
SODIUM SERPL-SCNC: 140 MMOL/L — SIGNIFICANT CHANGE UP (ref 135–145)
WBC # BLD: 8.84 K/UL — SIGNIFICANT CHANGE UP (ref 3.8–10.5)
WBC # FLD AUTO: 8.84 K/UL — SIGNIFICANT CHANGE UP (ref 3.8–10.5)

## 2024-11-06 PROCEDURE — 72131 CT LUMBAR SPINE W/O DYE: CPT | Mod: MC

## 2024-11-06 PROCEDURE — 83036 HEMOGLOBIN GLYCOSYLATED A1C: CPT

## 2024-11-06 PROCEDURE — 36415 COLL VENOUS BLD VENIPUNCTURE: CPT

## 2024-11-06 PROCEDURE — 99284 EMERGENCY DEPT VISIT MOD MDM: CPT

## 2024-11-06 PROCEDURE — 85025 COMPLETE CBC W/AUTO DIFF WBC: CPT

## 2024-11-06 PROCEDURE — 99284 EMERGENCY DEPT VISIT MOD MDM: CPT | Mod: 25

## 2024-11-06 PROCEDURE — 70450 CT HEAD/BRAIN W/O DYE: CPT | Mod: MC

## 2024-11-06 PROCEDURE — T1013: CPT

## 2024-11-06 PROCEDURE — 82962 GLUCOSE BLOOD TEST: CPT

## 2024-11-06 PROCEDURE — 80053 COMPREHEN METABOLIC PANEL: CPT

## 2024-11-06 NOTE — ED ADULT TRIAGE NOTE - CHIEF COMPLAINT QUOTE
pt c/o multiple falls today, fall on monday, patient stated that she started having right sided weakness and now b/l weakness. no focal deficits, strength is equal b/l,

## 2024-11-06 NOTE — ED ADULT NURSE NOTE - OBJECTIVE STATEMENT
Pt presents to ED c/o fall at home, reports her foot "gave out," denies LOC, head strike. Pt denies complaints. RR even and unlabored on room air, no apparent distress noted at this time. Pt safety maintained.

## 2024-11-06 NOTE — ED ADULT NURSE NOTE - NSFALLRISKINTERV_ED_ALL_ED

## 2024-11-07 VITALS
HEART RATE: 62 BPM | OXYGEN SATURATION: 96 % | SYSTOLIC BLOOD PRESSURE: 126 MMHG | DIASTOLIC BLOOD PRESSURE: 59 MMHG | TEMPERATURE: 98 F

## 2024-11-07 PROCEDURE — 72131 CT LUMBAR SPINE W/O DYE: CPT | Mod: 26,MC

## 2024-11-07 PROCEDURE — 70450 CT HEAD/BRAIN W/O DYE: CPT | Mod: 26,MC

## 2024-11-07 NOTE — ED PROVIDER NOTE - NEUROLOGICAL, MLM
Alert and oriented, no focal deficits, no motor or sensory deficits. negative romberg, nonataxic and stable gait, DTR 2/4 b/l LE, MS 5/5 with b/l knee flexion, plantar and dorsiflexion

## 2024-11-07 NOTE — ED PROVIDER NOTE - CARE PROVIDER_API CALL
Lito Martinez  Neurology  [address] [place] [address], [facility] [address]  [place] [address]  Phone: [phone]  Fax: [phone]  Follow Up Time: 4-6 Days

## 2024-11-07 NOTE — ED PROVIDER NOTE - CLINICAL SUMMARY MEDICAL DECISION MAKING FREE TEXT BOX
Patient has a completely normal neurologic examination.  There are strong deep tendon reflexes, and as  such Guillain-Barré is not clinically consistent.  She also does not have hyperreflexia to suggest a demyelinating process.  Her motor strength is normal as well with 5 out of 5 strength bilaterally in all ranges of motion, not consistent with CVA or cauda equina.  She has no back pain.  Symptoms and not consistent with a peripheral nerve palsy or impingement.  The bilateral stocking distribution of foot numbness in the setting of poorly treated diabetes suggest that this may be a diabetic neuropathy, with poor proprioception causing her falls.  However here her Romberg is negative and she has not completely stable and normal-appearing gait.  She is medically stable for discharge and outpatient neurologic follow-up

## 2024-11-07 NOTE — ED PROVIDER NOTE - OBJECTIVE STATEMENT
64-year-old female with past ministry of diabetes presents with falls.  Patient reports that she had a fall on Monday and then 3 falls today.  She reports that her bilateral feet feel numb.  She denies feeling off balance, back pain, extremity weakness, lightheadedness or dizziness.

## 2024-11-07 NOTE — ED PROVIDER NOTE - NSFOLLOWUPINSTRUCTIONS_ED_ALL_ED_FT
- Follow up with your doctor within 2-3 days.   - Follow up with Neurology, see information above.   - Bring results with you to the appointment.   - Return to the ED for any new or worsening symptoms.

## 2024-11-07 NOTE — ED PROVIDER NOTE - PATIENT PORTAL LINK FT
You can access the FollowMyHealth Patient Portal offered by [facility] by registering at the following website: [email or web address]. By joining [employer]’s FollowMyHealth portal, you will also be able to view your health information using other applications (apps) compatible with our system.

## 2025-02-03 ENCOUNTER — APPOINTMENT (OUTPATIENT)
Dept: NEUROLOGY | Facility: CLINIC | Age: 65
End: 2025-02-03

## 2025-02-19 NOTE — END OF VISIT
[FreeTextEntry3] : I, Carl Downing solely acted as scribe for Dr. Venus Trcaey on 08/17/2022 \par All medical entries made by the Scribe were at my, Dr. Tracey’s, direction and personally\par dictated by me on 08/17/2022 . I have reviewed the chart and agree that the record\par accurately reflects my personal performance of the history, physical exam, assessment and plan. I\par have also personally directed, reviewed, and agreed with the chart.
Never smoker